# Patient Record
Sex: MALE | Employment: PART TIME | URBAN - METROPOLITAN AREA
[De-identification: names, ages, dates, MRNs, and addresses within clinical notes are randomized per-mention and may not be internally consistent; named-entity substitution may affect disease eponyms.]

---

## 2019-03-13 ENCOUNTER — HOSPITAL ENCOUNTER (INPATIENT)
Age: 54
LOS: 5 days | Discharge: HOME | DRG: 603 | End: 2019-03-18
Attending: EMERGENCY MEDICINE | Admitting: FAMILY MEDICINE
Payer: SELF-PAY

## 2019-03-13 ENCOUNTER — APPOINTMENT (OUTPATIENT)
Dept: VASCULAR SURGERY | Age: 54
DRG: 603 | End: 2019-03-13
Attending: PHYSICIAN ASSISTANT
Payer: SELF-PAY

## 2019-03-13 DIAGNOSIS — L03.115 CELLULITIS OF LEG, RIGHT: Primary | ICD-10-CM

## 2019-03-13 PROBLEM — D72.829 LEUKOCYTOSIS: Status: ACTIVE | Noted: 2019-03-13

## 2019-03-13 PROBLEM — R60.0 EDEMA OF RIGHT LOWER EXTREMITY: Status: ACTIVE | Noted: 2019-03-13

## 2019-03-13 LAB
ALBUMIN SERPL-MCNC: 2.7 G/DL (ref 3.5–5)
ALBUMIN/GLOB SERPL: 0.7 {RATIO} (ref 1.1–2.2)
ALP SERPL-CCNC: 138 U/L (ref 45–117)
ALT SERPL-CCNC: 59 U/L (ref 12–78)
ANION GAP SERPL CALC-SCNC: 7 MMOL/L (ref 5–15)
APTT PPP: 44.7 SEC (ref 22.1–32)
AST SERPL-CCNC: 28 U/L (ref 15–37)
BASOPHILS # BLD: 0 K/UL (ref 0–0.1)
BASOPHILS NFR BLD: 0 % (ref 0–1)
BILIRUB SERPL-MCNC: 0.5 MG/DL (ref 0.2–1)
BUN SERPL-MCNC: 17 MG/DL (ref 6–20)
BUN/CREAT SERPL: 20 (ref 12–20)
CALCIUM SERPL-MCNC: 8.2 MG/DL (ref 8.5–10.1)
CHLORIDE SERPL-SCNC: 107 MMOL/L (ref 97–108)
CO2 SERPL-SCNC: 25 MMOL/L (ref 21–32)
COMMENT, HOLDF: NORMAL
CREAT SERPL-MCNC: 0.86 MG/DL (ref 0.7–1.3)
DIFFERENTIAL METHOD BLD: ABNORMAL
EOSINOPHIL # BLD: 0.2 K/UL (ref 0–0.4)
EOSINOPHIL NFR BLD: 1 % (ref 0–7)
ERYTHROCYTE [DISTWIDTH] IN BLOOD BY AUTOMATED COUNT: 13.6 % (ref 11.5–14.5)
GLOBULIN SER CALC-MCNC: 3.7 G/DL (ref 2–4)
GLUCOSE SERPL-MCNC: 109 MG/DL (ref 65–100)
HCT VFR BLD AUTO: 40.6 % (ref 36.6–50.3)
HGB BLD-MCNC: 13.7 G/DL (ref 12.1–17)
IMM GRANULOCYTES # BLD AUTO: 0.1 K/UL (ref 0–0.04)
IMM GRANULOCYTES NFR BLD AUTO: 1 % (ref 0–0.5)
INR PPP: 2.8 (ref 0.9–1.1)
LACTATE BLD-SCNC: 1.36 MMOL/L (ref 0.4–2)
LYMPHOCYTES # BLD: 1.9 K/UL (ref 0.8–3.5)
LYMPHOCYTES NFR BLD: 12 % (ref 12–49)
MCH RBC QN AUTO: 28.7 PG (ref 26–34)
MCHC RBC AUTO-ENTMCNC: 33.7 G/DL (ref 30–36.5)
MCV RBC AUTO: 85.1 FL (ref 80–99)
MONOCYTES # BLD: 1.3 K/UL (ref 0–1)
MONOCYTES NFR BLD: 8 % (ref 5–13)
NEUTS SEG # BLD: 12.4 K/UL (ref 1.8–8)
NEUTS SEG NFR BLD: 78 % (ref 32–75)
NRBC # BLD: 0 K/UL (ref 0–0.01)
NRBC BLD-RTO: 0 PER 100 WBC
PLATELET # BLD AUTO: 204 K/UL (ref 150–400)
PMV BLD AUTO: 10.4 FL (ref 8.9–12.9)
POTASSIUM SERPL-SCNC: 3.3 MMOL/L (ref 3.5–5.1)
PROT SERPL-MCNC: 6.4 G/DL (ref 6.4–8.2)
PROTHROMBIN TIME: 26.7 SEC (ref 9–11.1)
RBC # BLD AUTO: 4.77 M/UL (ref 4.1–5.7)
SAMPLES BEING HELD,HOLD: NORMAL
SODIUM SERPL-SCNC: 139 MMOL/L (ref 136–145)
THERAPEUTIC RANGE,PTTT: ABNORMAL SECS (ref 58–77)
WBC # BLD AUTO: 15.9 K/UL (ref 4.1–11.1)

## 2019-03-13 PROCEDURE — 80053 COMPREHEN METABOLIC PANEL: CPT

## 2019-03-13 PROCEDURE — 65270000029 HC RM PRIVATE

## 2019-03-13 PROCEDURE — 85730 THROMBOPLASTIN TIME PARTIAL: CPT

## 2019-03-13 PROCEDURE — 85025 COMPLETE CBC W/AUTO DIFF WBC: CPT

## 2019-03-13 PROCEDURE — 93005 ELECTROCARDIOGRAM TRACING: CPT

## 2019-03-13 PROCEDURE — 93971 EXTREMITY STUDY: CPT

## 2019-03-13 PROCEDURE — 85610 PROTHROMBIN TIME: CPT

## 2019-03-13 PROCEDURE — 87040 BLOOD CULTURE FOR BACTERIA: CPT

## 2019-03-13 PROCEDURE — 83605 ASSAY OF LACTIC ACID: CPT

## 2019-03-13 PROCEDURE — 99284 EMERGENCY DEPT VISIT MOD MDM: CPT

## 2019-03-13 PROCEDURE — 36415 COLL VENOUS BLD VENIPUNCTURE: CPT

## 2019-03-13 RX ORDER — MORPHINE SULFATE 2 MG/ML
4 INJECTION, SOLUTION INTRAMUSCULAR; INTRAVENOUS
Status: COMPLETED | OUTPATIENT
Start: 2019-03-13 | End: 2019-03-14

## 2019-03-13 RX ORDER — ONDANSETRON 2 MG/ML
4 INJECTION INTRAMUSCULAR; INTRAVENOUS
Status: COMPLETED | OUTPATIENT
Start: 2019-03-13 | End: 2019-03-14

## 2019-03-14 ENCOUNTER — APPOINTMENT (OUTPATIENT)
Dept: CT IMAGING | Age: 54
DRG: 603 | End: 2019-03-14
Attending: NURSE PRACTITIONER
Payer: SELF-PAY

## 2019-03-14 LAB
ANION GAP SERPL CALC-SCNC: 8 MMOL/L (ref 5–15)
ATRIAL RATE: 77 BPM
BASOPHILS # BLD: 0 K/UL (ref 0–0.1)
BASOPHILS NFR BLD: 0 % (ref 0–1)
BUN SERPL-MCNC: 16 MG/DL (ref 6–20)
BUN/CREAT SERPL: 23 (ref 12–20)
CALCIUM SERPL-MCNC: 7.7 MG/DL (ref 8.5–10.1)
CALCULATED P AXIS, ECG09: 30 DEGREES
CALCULATED R AXIS, ECG10: 3 DEGREES
CALCULATED T AXIS, ECG11: 9 DEGREES
CHLORIDE SERPL-SCNC: 107 MMOL/L (ref 97–108)
CO2 SERPL-SCNC: 26 MMOL/L (ref 21–32)
CREAT SERPL-MCNC: 0.7 MG/DL (ref 0.7–1.3)
DIAGNOSIS, 93000: NORMAL
DIFFERENTIAL METHOD BLD: ABNORMAL
EOSINOPHIL # BLD: 0.4 K/UL (ref 0–0.4)
EOSINOPHIL NFR BLD: 3 % (ref 0–7)
ERYTHROCYTE [DISTWIDTH] IN BLOOD BY AUTOMATED COUNT: 13.6 % (ref 11.5–14.5)
GLUCOSE SERPL-MCNC: 103 MG/DL (ref 65–100)
HCT VFR BLD AUTO: 37.2 % (ref 36.6–50.3)
HGB BLD-MCNC: 12.5 G/DL (ref 12.1–17)
IMM GRANULOCYTES # BLD AUTO: 0.1 K/UL (ref 0–0.04)
IMM GRANULOCYTES NFR BLD AUTO: 1 % (ref 0–0.5)
INR PPP: 2.7 (ref 0.9–1.1)
LYMPHOCYTES # BLD: 1.8 K/UL (ref 0.8–3.5)
LYMPHOCYTES NFR BLD: 15 % (ref 12–49)
MCH RBC QN AUTO: 28.7 PG (ref 26–34)
MCHC RBC AUTO-ENTMCNC: 33.6 G/DL (ref 30–36.5)
MCV RBC AUTO: 85.3 FL (ref 80–99)
MONOCYTES # BLD: 1 K/UL (ref 0–1)
MONOCYTES NFR BLD: 8 % (ref 5–13)
NEUTS SEG # BLD: 9 K/UL (ref 1.8–8)
NEUTS SEG NFR BLD: 73 % (ref 32–75)
NRBC # BLD: 0 K/UL (ref 0–0.01)
NRBC BLD-RTO: 0 PER 100 WBC
P-R INTERVAL, ECG05: 176 MS
PLATELET # BLD AUTO: 173 K/UL (ref 150–400)
PMV BLD AUTO: 10.8 FL (ref 8.9–12.9)
POTASSIUM SERPL-SCNC: 3.7 MMOL/L (ref 3.5–5.1)
PROTHROMBIN TIME: 25.6 SEC (ref 9–11.1)
Q-T INTERVAL, ECG07: 388 MS
QRS DURATION, ECG06: 96 MS
QTC CALCULATION (BEZET), ECG08: 439 MS
RBC # BLD AUTO: 4.36 M/UL (ref 4.1–5.7)
SODIUM SERPL-SCNC: 141 MMOL/L (ref 136–145)
VENTRICULAR RATE, ECG03: 77 BPM
WBC # BLD AUTO: 12.3 K/UL (ref 4.1–11.1)

## 2019-03-14 PROCEDURE — 74011000258 HC RX REV CODE- 258: Performed by: HOSPITALIST

## 2019-03-14 PROCEDURE — 85610 PROTHROMBIN TIME: CPT

## 2019-03-14 PROCEDURE — 74011250637 HC RX REV CODE- 250/637: Performed by: HOSPITALIST

## 2019-03-14 PROCEDURE — 80048 BASIC METABOLIC PNL TOTAL CA: CPT

## 2019-03-14 PROCEDURE — 36415 COLL VENOUS BLD VENIPUNCTURE: CPT

## 2019-03-14 PROCEDURE — 74011250636 HC RX REV CODE- 250/636: Performed by: FAMILY MEDICINE

## 2019-03-14 PROCEDURE — 74011000258 HC RX REV CODE- 258: Performed by: FAMILY MEDICINE

## 2019-03-14 PROCEDURE — 65270000032 HC RM SEMIPRIVATE

## 2019-03-14 PROCEDURE — 85025 COMPLETE CBC W/AUTO DIFF WBC: CPT

## 2019-03-14 PROCEDURE — 74011250636 HC RX REV CODE- 250/636: Performed by: PHYSICIAN ASSISTANT

## 2019-03-14 PROCEDURE — 74011250637 HC RX REV CODE- 250/637: Performed by: FAMILY MEDICINE

## 2019-03-14 PROCEDURE — 74011636320 HC RX REV CODE- 636/320: Performed by: HOSPITALIST

## 2019-03-14 PROCEDURE — 74011000258 HC RX REV CODE- 258: Performed by: PHYSICIAN ASSISTANT

## 2019-03-14 PROCEDURE — 74011250637 HC RX REV CODE- 250/637: Performed by: NURSE PRACTITIONER

## 2019-03-14 PROCEDURE — 73701 CT LOWER EXTREMITY W/DYE: CPT

## 2019-03-14 RX ORDER — SODIUM CHLORIDE 0.9 % (FLUSH) 0.9 %
5-40 SYRINGE (ML) INJECTION AS NEEDED
Status: DISCONTINUED | OUTPATIENT
Start: 2019-03-14 | End: 2019-03-18 | Stop reason: HOSPADM

## 2019-03-14 RX ORDER — SODIUM CHLORIDE 0.9 % (FLUSH) 0.9 %
5-40 SYRINGE (ML) INJECTION EVERY 8 HOURS
Status: DISCONTINUED | OUTPATIENT
Start: 2019-03-14 | End: 2019-03-18 | Stop reason: HOSPADM

## 2019-03-14 RX ORDER — WARFARIN 2.5 MG/1
2.5 TABLET ORAL ONCE
Status: COMPLETED | OUTPATIENT
Start: 2019-03-14 | End: 2019-03-14

## 2019-03-14 RX ORDER — ONDANSETRON 2 MG/ML
4 INJECTION INTRAMUSCULAR; INTRAVENOUS
Status: DISCONTINUED | OUTPATIENT
Start: 2019-03-14 | End: 2019-03-18 | Stop reason: HOSPADM

## 2019-03-14 RX ORDER — ASPIRIN 81 MG/1
81 TABLET ORAL DAILY
Status: DISCONTINUED | OUTPATIENT
Start: 2019-03-14 | End: 2019-03-18 | Stop reason: HOSPADM

## 2019-03-14 RX ORDER — POTASSIUM CHLORIDE 750 MG/1
40 TABLET, FILM COATED, EXTENDED RELEASE ORAL
Status: COMPLETED | OUTPATIENT
Start: 2019-03-14 | End: 2019-03-14

## 2019-03-14 RX ORDER — ACETAMINOPHEN 325 MG/1
650 TABLET ORAL
Status: DISCONTINUED | OUTPATIENT
Start: 2019-03-14 | End: 2019-03-18 | Stop reason: HOSPADM

## 2019-03-14 RX ORDER — MORPHINE SULFATE 2 MG/ML
2 INJECTION, SOLUTION INTRAMUSCULAR; INTRAVENOUS
Status: DISCONTINUED | OUTPATIENT
Start: 2019-03-14 | End: 2019-03-15

## 2019-03-14 RX ORDER — SODIUM CHLORIDE 0.9 % (FLUSH) 0.9 %
10 SYRINGE (ML) INJECTION
Status: COMPLETED | OUTPATIENT
Start: 2019-03-14 | End: 2019-03-14

## 2019-03-14 RX ORDER — TRAMADOL HYDROCHLORIDE 50 MG/1
50 TABLET ORAL
Status: DISCONTINUED | OUTPATIENT
Start: 2019-03-14 | End: 2019-03-17

## 2019-03-14 RX ADMIN — CEFAZOLIN SODIUM 1 G: 1 INJECTION, POWDER, FOR SOLUTION INTRAMUSCULAR; INTRAVENOUS at 09:09

## 2019-03-14 RX ADMIN — TRAMADOL HYDROCHLORIDE 50 MG: 50 TABLET, FILM COATED ORAL at 17:33

## 2019-03-14 RX ADMIN — Medication 10 ML: at 14:39

## 2019-03-14 RX ADMIN — ONDANSETRON 4 MG: 2 INJECTION INTRAMUSCULAR; INTRAVENOUS at 01:02

## 2019-03-14 RX ADMIN — MORPHINE SULFATE 4 MG: 2 INJECTION, SOLUTION INTRAMUSCULAR; INTRAVENOUS at 01:02

## 2019-03-14 RX ADMIN — IOPAMIDOL 100 ML: 612 INJECTION, SOLUTION INTRAVENOUS at 14:39

## 2019-03-14 RX ADMIN — ACETAMINOPHEN 650 MG: 325 TABLET ORAL at 20:47

## 2019-03-14 RX ADMIN — SODIUM CHLORIDE 1000 ML: 900 INJECTION, SOLUTION INTRAVENOUS at 01:02

## 2019-03-14 RX ADMIN — Medication 10 ML: at 20:48

## 2019-03-14 RX ADMIN — WARFARIN SODIUM 2.5 MG: 2.5 TABLET ORAL at 16:19

## 2019-03-14 RX ADMIN — Medication 10 ML: at 06:00

## 2019-03-14 RX ADMIN — ASPIRIN 81 MG: 81 TABLET ORAL at 09:09

## 2019-03-14 RX ADMIN — CEFAZOLIN SODIUM 1 G: 1 INJECTION, POWDER, FOR SOLUTION INTRAMUSCULAR; INTRAVENOUS at 01:18

## 2019-03-14 RX ADMIN — Medication 10 ML: at 15:27

## 2019-03-14 RX ADMIN — POTASSIUM CHLORIDE 40 MEQ: 750 TABLET, EXTENDED RELEASE ORAL at 02:01

## 2019-03-14 RX ADMIN — SODIUM CHLORIDE 100 ML: 900 INJECTION, SOLUTION INTRAVENOUS at 14:39

## 2019-03-14 RX ADMIN — CEFAZOLIN SODIUM 1 G: 1 INJECTION, POWDER, FOR SOLUTION INTRAMUSCULAR; INTRAVENOUS at 16:19

## 2019-03-14 NOTE — H&P
[address]   HISTORY AND PHYSICAL    Name:  Naheed Paris  MR#:  [id number]  :  1965  ACCOUNT #:  [de-identified]  ADMIT DATE:  2019    CHIEF COMPLAINT:  Right lower extremity pain and swelling. HISTORY OF PRESENT ILLNESS:  A 51-year-old  male with past medical history of hypertension, stroke, long-term anti-coagulation, on Coumadin, presented to emergency department with chief complaint of right leg redness, pain, and swelling. He has also noticed radiation of pain to his right thigh with noted redness. Symptoms onset reportedly began approximately four days ago. Swelling was progressive, bothering most of the right leg with noted redness and pain. Pain was nearly moderate in severity. He also complained of having fevers over the last three days. He is not reporting weakness, trauma, prolonged immobilization or surgeries. On arrival to the emergency department, initially reported vital signs, blood pressure 121/75, heart rate of 80, respiratory rate of 20, saturations 98% on room air. A workup included labs showing WBC of 15,900 with left shift and neutrophils 79%. The patient takes Coumadin and INR was therapeutic at 2.8. Right lower extremity venous duplex ultrasound was negative for evidence of DVT. The patient is now seen for admission to the hospital for continued evaluation and treatment. Per the ED, the patient was started on Ancef 1 g IV for antibiotic coverage. Currently, the patient does not complain of dizziness, lightheadedness, focal weakness, numbness, paresthesias, slurred speech, facial droop, headache, neck pain, back pain, chest pain, shortness of breath, cough, congestion, abdominal pain, nausea, vomiting, diarrhea, melena, dysuria, or hematuria. PAST MEDICAL HISTORY:  1. Hypertension. 2.  Stroke. PAST SURGICAL HISTORY:  None. CURRENT MEDICATIONS:  List reviewed, noted on chart records.     Taking Last Dose Start Date End Date Provider    aspirin delayed-release 81 mg tablet    --  --  Provider, Historical    Take  by mouth daily. warfarin (COUMADIN) 2.5 mg tablet    --  --  Provider, Historical    Take 2.5 mg by mouth daily. ALLERGIES:  NO KNOWN DRUG ALLERGIES. SOCIAL HISTORY:  The patient denies smoking, alcohol, or illicit drugs. FAMILY HISTORY:  Hypertension in mother. REVIEW OF SYSTEMS:  Pertinent positives as noted in the HPI. All other systems were reviewed and negative. PHYSICAL EXAMINATION:  VITAL SIGNS:  Temperature is 97.8 degrees Fahrenheit, blood pressure 148/84, heart rate of 59, respiratory rate of 14, O2 saturation 100% on room air. Recorded weight of 203 pounds (92.3 kg), height of 5 feet 9 inches tall. GENERAL:  Overweight male, in no acute respiratory distress. PSYCH:  Awake and alert x3. NEUROLOGICAL:  GCS 15. Moves extremities x4. Sensation is grossly intact without slurred speech or facial droop. HEENT:  Normocephalic and atraumatic. PERRLA. EOMs intact. Sclerae anicteric. Conjunctivae clear. Nares are patent. Oropharynx is clear. Tongue is midline, non-edematous. NECK:  Supple without adenopathy, JVD, carotid bruit, or thyromegaly. LYMPH:  Negative for cervical or supraclavicular adenopathy. LUNGS:  Clear to auscultation bilaterally. CVS/HEART:  Bradycardiac. Regular rhythm without murmurs, rubs or gallops. GI/ABDOMEN:  Soft, nontender, and nondistended. Normoactive bowel sounds. No rebound, guarding or rigidity. No auscultated abdominal bruits. No palpable abdominal mass. BACK:  No CVA tenderness. No step-off deformity. MUSCULOSKELETAL:  Tenderness on palpation of the right leg and foot and ankle, which are markedly swollen and edematous with erythema extending from the right leg along the medial aspect of his right thigh. Negative for left calf tenderness. VASCULAR:  2+ radial and 1+ dorsalis pedis pulses without cyanosis or clubbing.   There is marked nonpitting edema of the right leg and foot. SKIN:  Warm and dry. LABORATORY DATA:  Reviewed, as follows:  Sodium 139, potassium 3.3, chloride 107, CO2 25, BUN 17, creatinine 0.86, glucose 109. Anion gap 7, calcium 8.2, GFR greater than 60. Total bilirubin 0.5, total protein is 6.4, albumin is 3.7. ALT of 59, AST of 20, alkaline phosphatase is 138. WBC of 15.9, hemoglobin 13.7, hematocrit of 40.6, platelets of 906, neutrophils of 78%. INR of 2.8, PT of 26.7, PTT of 24.7. Right lower extremity venous duplex results are reviewed, showing no evidence of DVT. A 12-lead EKG showing normal sinus rhythm, 77 beats per minute. IMPRESSION AND PLAN:  1. Right lower extremity cellulitis. Admit the patient to medical floor. Continue Ancef 1 g q.8 hours. 2.  Right lower extremity edema. Keep the right lower extremity elevated at rest.  3.  Right lower extremity pain. Provide pain management and supportive care. 4.  Hypertension. Currently not on any medicine for the same. Provide antihypertensive medications. 5.  Bradycardia. Repeat vital signs. Monitor closely. 6.  Stroke. No acute neurologic deficits on exam.  7.  Long-term anticoagulation on Coumadin. Per the patient, he has been continued on anticoagulation for prior stroke. No reported history of pulmonary embolism, deep venous thrombosis, atrial fibrillation or other diagnosis requiring treatment with Coumadin. Repeat PT/INR levels in a.m.  8.  Venous thromboembolism prophylaxis. The patient will be on anticoagulant therapy. Consult pharmacist for Coumadin doses. 9.  Code Status:  Full code. 10.  Functional status:  Ambulatory.         Prisca Lopez MD [email or web address]  D:  03/14/2019 1:26  T:  03/14/2019 4:20  JOB #:  [id number]

## 2019-03-14 NOTE — PROGRESS NOTES
Pharmacist Note  Warfarin Dosing  Consult provided for this 48 y.o.male to manage warfarin for \"prior stroke\"  - Per note dated 3/13/19: \"No reported history of pulmonary embolism, deep venous thrombosis, atrial fibrillation or other diagnosis requiring treatment with Coumadin\"       INR Goal: 2 - 3    Home regimen/ tablet size: 2.5 mg daily    Drugs that may increase INR: None  Drugs that may decrease INR: None  Other current anticoagulants/ drugs that may increase bleeding risk: Aspirin  Risk factors: Liver dysfunction  Daily INR ordered: YES    Recent Labs     03/14/19  0353 03/13/19  1922   HGB 12.5 13.7   INR  --  2.8*     Date               INR                  Dose  3/13  2.8  2.5 mg (at home)  3/14  --  TBD                                                                                    Assessment/ Plan:  PT/INR ordered for today and daily thereafter. Will order warfarin as clinically indicated after today's INR results. Pharmacy will continue to monitor daily and adjust therapy as indicated. Please contact the pharmacist at x [phone] or  for outpatient recommendations if needed.

## 2019-03-14 NOTE — ROUTINE PROCESS
TRANSFER - OUT REPORT:    Verbal report given to [employer] SYS WASECA (name) on Patricia Pappas  being transferred to (unit) for routine progression of care       Report consisted of patients Situation, Background, Assessment and   Recommendations(SBAR). Information from the following report(s) SBAR, ED Summary, [facility] and Recent Results was reviewed with the receiving nurse. Lines:   Peripheral IV 03/13/19 Left Antecubital (Active)        Opportunity for questions and clarification was provided.       Patient transported with:   K[facility]o

## 2019-03-14 NOTE — PROGRESS NOTES
Problem: Falls - Risk of  Goal: *Absence of Falls  Document Chao Fall Risk and appropriate interventions in the flowsheet. Outcome: Progressing Towards Goal  Fall Risk Interventions:            Medication Interventions: Teach patient to arise slowly              0229: TRANSFER - IN REPORT:    Verbal report received from Ayad Armas (name) on Krysta Walls  being received from ED (unit) for routine progression of care      Report consisted of patients Situation, Background, Assessment and   Recommendations(SBAR). Information from the following report(s) SBAR, Kardex, ED Summary, [facility] and Cardiac Rhythm Normal sinus was reviewed with the receiving nurse. Opportunity for questions and clarification was provided. Assessment completed upon patients arrival to unit and care assumed. Primary Nurse Ashley Man and Analy Johns RN performed a dual skin assessment on this patient Impairment noted- see wound doc flow sheet  Flakito score is 21    Noted scab on right foot, as well as, noted that RLE is red, swollen, and warm to the touch. Pt states mild pain and received pain medication when in the emergency department. No other impairments noted on the skin. Will continue to monitor. No distress noted, call light in reach, bed in lowest position, pt denies any needs at present moment.

## 2019-03-14 NOTE — PROGRESS NOTES
Problem: Falls - Risk of  Goal: *Absence of Falls  Document Chao Fall Risk and appropriate interventions in the flowsheet.   Outcome: Progressing Towards Goal  Fall Risk Interventions:            Medication Interventions: Teach patient to arise slowly

## 2019-03-14 NOTE — ED TRIAGE NOTES
Pt reports pain in RIGHT groin and fever that started Sunday. Pt states he massaged the area and now the entirety of the RIGHT lower leg is significantly swollen, red, painful. Denies any known break in the skin or hx of DVT, but review of chart shows pt is on Coumadin for hx of stroke. Reports +fever, chills. Last took Ibuprofen 1600.

## 2019-03-14 NOTE — PROGRESS NOTES
Pharmacist Note  Warfarin Dosing  Consult provided for this 48 y.o.male to manage warfarin for \"prior stroke\"  - Per note dated 3/13/19: \"No reported history of pulmonary embolism, deep venous thrombosis, atrial fibrillation or other diagnosis requiring treatment with Coumadin\"       INR Goal: 2 - 3    Home regimen/ tablet size: 2.5 mg daily    Drugs that may increase INR: None  Drugs that may decrease INR: None  Other current anticoagulants/ drugs that may increase bleeding risk: Aspirin  Risk factors: Liver dysfunction  Daily INR ordered: YES    Recent Labs     03/14/19  0855 03/14/19  0353 03/13/19  1922   HGB  --  12.5 13.7   INR 2.7*  --  2.8*     Date               INR                  Dose  3/13  2.8  2.5 mg (at home)  3/14  2.7  2.5 mg                                                                                 Assessment/ Plan: Will order 2.5 mg x one dose today. Pharmacy will continue to monitor daily and adjust therapy as indicated. Please contact the pharmacist at x [phone] or  for outpatient recommendations if needed.

## 2019-03-14 NOTE — WOUND CARE
Wound Consult:  New Patient Visit. Chart reviewed. Consulted for right leg cellulitis. Patient is resting on a total care bed. Assessment:  Patient tells me he has had this in past; at that time an area was found and lanced by MD to drain. He tells me the redness started in his upper medial thigh and he massage area where it was red and indurated. This area has essentially resolved however it then extended in lower leg through foot - especially tender/red over medial ankle area; no induration, crepitus or fluctuance is appreciated in any part of leg through foot. No open wounds; has a few small scattered scabs on medial ankle/foot and medial knee area - may have been areas where infection could have been introduced? Recommendations:  Continue to monitor nutrition, pain, and skin risk scale, and skin assessment. No open areas nor does there appear any area to I&D currently; would continue to monitor; IV antibiotic therapy. Plan:  Please re-consult if needed.   Warner Issa, [address] Office [phone]  Pager [phone]

## 2019-03-14 NOTE — ED NOTES
Gave bedside report regarding, SBAR, MAR, and plan of care to [facility]. Transfered care of patient to RN.

## 2019-03-14 NOTE — PROGRESS NOTES
Hospitalist Progress Note  Syd Diallo NP  Answering service: [phone] from in house phone  Cell: [phone]      Date of Service:  3/14/2019  NAME:  Rivka Villagomez  :  1965  MRN:  [id number]      Admission Summary:   48 yom with pmh of HTN, Stroke, ? Clotting disorder on long term Coumadin use who presented with RLE redness, pain and swelling that started on  and continued to worsen with fevers. Interval history / Subjective:     Patient resting in bed. Reports right leg looks a little better but still swollen and having pain with ambulating. No fevers or chills today, good appetite. Assessment & Plan:     RLE cellulitis   -RLE doppler showing prominent right groin lymph nodes and right popliteal fossa fluid collection  -has h/o abscess with I&D of right thigh last year  -check CT RLE to eval for abscess   -follow blood cultures  -wbc trending down  -prn pain meds, add Tramadol prn today  -elevate RLE  -IV Ancef    Long Term Anticoagulant use   -on Coumadin per pt from clotting disorder and stroke   -pharmacy to dose Coumadin     HTN   -not on home medications, elevated on admission, now wnl   -monitor     H/o Stroke   -continue home meds     Bradycardia-resolved      Code status: Full  DVT prophylaxis: Coumadin    Care Plan discussed with: Patient/Family and Nurse Dr Robert Dill  Disposition: Home w/Family and TBD     Hospital Problems  Date Reviewed: 3/14/2019          Codes Class Noted POA    Leukocytosis ICD-10-CM: F65.454  ICD-9-CM: 288.60  3/13/2019 Unknown        * (Principal) Cellulitis of right lower extremity ICD-10-CM: L03.115  ICD-9-CM: 682.6  3/13/2019 Unknown        Edema of right lower extremity ICD-10-CM: R60.0  ICD-9-CM: 782.3  3/13/2019 Unknown                Review of Systems:   A comprehensive review of systems was negative except for that written in the HPI.        Vital Signs:    Last 24hrs VS reviewed since prior progress note. Most recent are:  Visit Vitals  /70 (BP 1 Location: Right arm, BP Patient Position: At rest)   Pulse 63   Temp 97.9 °F (36.6 °C)   Resp 18   Ht 5' 9\" (1.753 m)   Wt 92.3 kg (203 lb 7.8 oz)   SpO2 98%   BMI 30.05 kg/m²         Intake/Output Summary (Last 24 hours) at 3/14/2019 1004  Last data filed at 3/14/2019 0488  Gross per 24 hour   Intake 240 ml   Output    Net 240 ml        Physical Examination:             Constitutional:  No acute distress, cooperative, pleasant    ENT:  Oral mucous moist, oropharynx benign. Neck supple,    Resp:  CTA bilaterally. No wheezing/rhonchi/rales. No accessory muscle use   CV:  Regular rhythm, normal rate, no murmurs, gallops, rubs    GI:  Soft, non distended, non tender. normoactive bowel sounds,    Musculoskeletal:  No edema, warm, 2+ pulses throughout    Neurologic:  Moves all extremities. AAOx3, CN II-XII reviewed     Psych:  Good insight, Not anxious nor agitated. Skin:  RLE edema with redness and warm to touch, no palpable fluid collection       Data Review:    Review and/or order of clinical lab test  Review and/or order of tests in the radiology section of CPT  Review and/or order of tests in the medicine section of CPT      Labs:     Recent Labs     03/14/19  0353 03/13/19 1922   WBC 12.3* 15.9*   HGB 12.5 13.7   HCT 37.2 40.6    204     Recent Labs     03/14/19  0353 03/13/19 1922    139   K 3.7 3.3*    107   CO2 26 25   BUN 16 17   CREA 0.70 0.86   * 109*   CA 7.7* 8.2*     Recent Labs     03/13/19 1922   SGOT 28   ALT 59   *   TBILI 0.5   TP 6.4   ALB 2.7*   GLOB 3.7     Recent Labs     03/14/19  0855 03/13/19 1922   INR 2.7* 2.8*   PTP 25.6* 26.7*   APTT  --  44.7*      No results for input(s): FE, TIBC, PSAT, FERR in the last 72 hours. No results found for: FOL, RBCF   No results for input(s): PH, PCO2, PO2 in the last 72 hours.   No results for input(s): CPK, CKNDX, TROIQ in the last 72 hours.    No lab exists for component: CPKMB  No results found for: CHOL, CHOLX, CHLST, CHOLV, HDL, LDL, LDLC, DLDLP, TGLX, TRIGL, TRIGP, CHHD, CHHDX  No results found for: GLUCPOC  No results found for: COLOR, APPRN, SPGRU, REFSG, PATRICIA, PROTU, GLUCU, KETU, BILU, UROU, WERO, LEUKU, GLUKE, EPSU, BACTU, WBCU, RBCU, CASTS, UCRY      Medications Reviewed:     Current Facility-Administered Medications   Medication Dose Route Frequency    aspirin delayed-release tablet 81 mg  81 mg Oral DAILY    sodium chloride (NS) flush 5-40 mL  5-40 mL IntraVENous Q8H    sodium chloride (NS) flush 5-40 mL  5-40 mL IntraVENous PRN    ondansetron (ZOFRAN) injection 4 mg  4 mg IntraVENous Q6H PRN    morphine injection 2 mg  2 mg IntraVENous Q4H PRN    ceFAZolin (ANCEF) 1 g in 0.9% sodium chloride (MBP/ADV) 50 mL  1 g IntraVENous Q8H     ______________________________________________________________________  EXPECTED LENGTH OF STAY: - - -  ACTUAL LENGTH OF STAY:          1                 Nelli Guerin NP

## 2019-03-14 NOTE — ED PROVIDER NOTES
49 yo M with hx of stroke and HTN her for evaluation of leg swelling. States sunday night pain to right upper leg; states he massaged area and then  increased swelling/redness. No noted open wounds. Subjective fever x 3 days. Currently taking Coumadin 5 mg daily. Hx of stroke. PCP Dr Hardik Graham. No recent travel; no recent surgery. Denies CP, SOB, abd pain, flank pain, urinary symptoms. The history is provided by the patient. The history is limited by a language barrier. A  was used. Leg Swelling    This is a new problem. The current episode started more than 2 days ago. The problem occurs constantly. The problem has been gradually worsening. The pain is present in the right lower leg. The quality of the pain is described as aching. The pain is at a severity of 9/10. The pain is moderate. There has been no history of extremity trauma. Fever    Pertinent negatives include no cough. Past Medical History:   Diagnosis Date    Hypertension     Stroke [facility])        History reviewed. No pertinent surgical history. History reviewed. No pertinent family history.     Social History     Socioeconomic History    Marital status: SINGLE     Spouse name: Not on file    Number of children: Not on file    Years of education: Not on file    Highest education level: Not on file   Social Needs    Financial resource strain: Not on file    Food insecurity - worry: Not on file    Food insecurity - inability: Not on file    Transportation needs - medical: Not on file   [employer] needs - non-medical: Not on file   Occupational History    Not on file   Tobacco Use    Smoking status: Never Smoker    Smokeless tobacco: Never Used   Substance and Sexual Activity    Alcohol use: No     Frequency: Never    Drug use: No    Sexual activity: Not on file   Other Topics Concern    Not on file   Social History Narrative    Not on file         ALLERGIES: Patient has no known allergies. Review of Systems   Constitutional: Positive for fever. HENT: Negative for facial swelling. Eyes: Negative for discharge. Respiratory: Negative for cough. Cardiovascular: Negative for leg swelling. Gastrointestinal: Negative for abdominal distention. Musculoskeletal: Positive for myalgias. Skin: Positive for color change. Neurological: Negative for seizures and syncope. Psychiatric/Behavioral: Negative for behavioral problems. Vitals:    03/13/19 1910 03/13/19 2023   BP:  121/75   Pulse: 80 66   Resp:  20   Temp:  97.8 °F (36.6 °C)   SpO2: 98% 98%   Weight:  92.3 kg (203 lb 7.8 oz)   Height:  5' 9\" (1.753 m)            Physical Exam   Constitutional: He is oriented to person, place, and time. He appears well-developed and well-nourished. HENT:   Head: Normocephalic and atraumatic. Right Ear: External ear normal.   Left Ear: External ear normal.   Nose: Nose normal.   Mouth/Throat: Oropharynx is clear and moist.   Eyes: Conjunctivae and EOM are normal. Pupils are equal, round, and reactive to light. Right eye exhibits no discharge. Left eye exhibits no discharge. Neck: Normal range of motion. Neck supple. Cardiovascular: Normal rate, regular rhythm, normal heart sounds and intact distal pulses. Pulses:       Dorsalis pedis pulses are 2+ on the right side. Posterior tibial pulses are 2+ on the right side. Pulmonary/Chest: Effort normal and breath sounds normal.   Abdominal: Soft. Bowel sounds are normal. He exhibits no distension. There is no tenderness. There is no rebound and no guarding. Musculoskeletal: Normal range of motion. He exhibits edema. Right lower leg: He exhibits swelling (Extensive Redness/swelling to right lower leg; extends to thigh) and edema. Lymphadenopathy:        Right: Inguinal adenopathy present. Neurological: He is alert and oriented to person, place, and time. No cranial nerve deficit.  Coordination normal.   Skin: Skin is warm and dry. No rash noted. Psychiatric: He has a normal mood and affect. His behavior is normal. Judgment and thought content normal.   Nursing note and vitals reviewed. MDM       Procedures    Discussed case with attending Physician Nacho; in to see. Agrees with care and plan. VELMA Rivera     Patient has been reassessed. Feeling much better. Reviewed labs, medications and radiographics with patient. Aware and agrees to admission. Hospitalist Deirdre for Admission  10:45 PM    ED Room Number: ER05/05  Patient Name and age:  Analy Thakkar 48 y.o.  male  Working Diagnosis:   1.  Cellulitis of leg, right      Readmission: no  Isolation Requirements:  no  Recommended Level of Care:  med/surg  Code Status:  Full

## 2019-03-15 LAB
ANION GAP SERPL CALC-SCNC: 6 MMOL/L (ref 5–15)
BASOPHILS # BLD: 0.1 K/UL (ref 0–0.1)
BASOPHILS NFR BLD: 0 % (ref 0–1)
BUN SERPL-MCNC: 15 MG/DL (ref 6–20)
BUN/CREAT SERPL: 19 (ref 12–20)
CALCIUM SERPL-MCNC: 7.7 MG/DL (ref 8.5–10.1)
CHLORIDE SERPL-SCNC: 108 MMOL/L (ref 97–108)
CO2 SERPL-SCNC: 25 MMOL/L (ref 21–32)
CREAT SERPL-MCNC: 0.79 MG/DL (ref 0.7–1.3)
DIFFERENTIAL METHOD BLD: ABNORMAL
EOSINOPHIL # BLD: 0.4 K/UL (ref 0–0.4)
EOSINOPHIL NFR BLD: 4 % (ref 0–7)
ERYTHROCYTE [DISTWIDTH] IN BLOOD BY AUTOMATED COUNT: 13.7 % (ref 11.5–14.5)
GLUCOSE SERPL-MCNC: 120 MG/DL (ref 65–100)
HCT VFR BLD AUTO: 38.8 % (ref 36.6–50.3)
HGB BLD-MCNC: 12.7 G/DL (ref 12.1–17)
IMM GRANULOCYTES # BLD AUTO: 0.1 K/UL (ref 0–0.04)
IMM GRANULOCYTES NFR BLD AUTO: 1 % (ref 0–0.5)
INR PPP: 2.2 (ref 0.9–1.1)
LYMPHOCYTES # BLD: 2.1 K/UL (ref 0.8–3.5)
LYMPHOCYTES NFR BLD: 18 % (ref 12–49)
MCH RBC QN AUTO: 27.9 PG (ref 26–34)
MCHC RBC AUTO-ENTMCNC: 32.7 G/DL (ref 30–36.5)
MCV RBC AUTO: 85.1 FL (ref 80–99)
MONOCYTES # BLD: 0.8 K/UL (ref 0–1)
MONOCYTES NFR BLD: 7 % (ref 5–13)
NEUTS SEG # BLD: 8.1 K/UL (ref 1.8–8)
NEUTS SEG NFR BLD: 70 % (ref 32–75)
NRBC # BLD: 0 K/UL (ref 0–0.01)
NRBC BLD-RTO: 0 PER 100 WBC
PLATELET # BLD AUTO: 201 K/UL (ref 150–400)
PMV BLD AUTO: 10.8 FL (ref 8.9–12.9)
POTASSIUM SERPL-SCNC: 3.6 MMOL/L (ref 3.5–5.1)
PROTHROMBIN TIME: 21.1 SEC (ref 9–11.1)
RBC # BLD AUTO: 4.56 M/UL (ref 4.1–5.7)
SODIUM SERPL-SCNC: 139 MMOL/L (ref 136–145)
WBC # BLD AUTO: 11.6 K/UL (ref 4.1–11.1)

## 2019-03-15 PROCEDURE — 74011000258 HC RX REV CODE- 258: Performed by: FAMILY MEDICINE

## 2019-03-15 PROCEDURE — 74011250637 HC RX REV CODE- 250/637: Performed by: NURSE PRACTITIONER

## 2019-03-15 PROCEDURE — 74011250636 HC RX REV CODE- 250/636: Performed by: FAMILY MEDICINE

## 2019-03-15 PROCEDURE — 74011250637 HC RX REV CODE- 250/637: Performed by: HOSPITALIST

## 2019-03-15 PROCEDURE — 85025 COMPLETE CBC W/AUTO DIFF WBC: CPT

## 2019-03-15 PROCEDURE — 36415 COLL VENOUS BLD VENIPUNCTURE: CPT

## 2019-03-15 PROCEDURE — 74011250637 HC RX REV CODE- 250/637: Performed by: FAMILY MEDICINE

## 2019-03-15 PROCEDURE — 80048 BASIC METABOLIC PNL TOTAL CA: CPT

## 2019-03-15 PROCEDURE — 85610 PROTHROMBIN TIME: CPT

## 2019-03-15 PROCEDURE — 65270000032 HC RM SEMIPRIVATE

## 2019-03-15 RX ADMIN — CEFAZOLIN SODIUM 1 G: 1 INJECTION, POWDER, FOR SOLUTION INTRAMUSCULAR; INTRAVENOUS at 16:13

## 2019-03-15 RX ADMIN — WARFARIN SODIUM 3 MG: 2 TABLET ORAL at 16:17

## 2019-03-15 RX ADMIN — CEFAZOLIN SODIUM 1 G: 1 INJECTION, POWDER, FOR SOLUTION INTRAMUSCULAR; INTRAVENOUS at 00:58

## 2019-03-15 RX ADMIN — ASPIRIN 81 MG: 81 TABLET ORAL at 08:55

## 2019-03-15 RX ADMIN — Medication 10 ML: at 22:23

## 2019-03-15 RX ADMIN — TRAMADOL HYDROCHLORIDE 50 MG: 50 TABLET, FILM COATED ORAL at 18:22

## 2019-03-15 RX ADMIN — ACETAMINOPHEN 650 MG: 325 TABLET ORAL at 22:23

## 2019-03-15 RX ADMIN — CEFAZOLIN SODIUM 1 G: 1 INJECTION, POWDER, FOR SOLUTION INTRAMUSCULAR; INTRAVENOUS at 08:55

## 2019-03-15 NOTE — PROGRESS NOTES
Hospitalist Progress Note  Modesto Lay NP  Answering service: [phone] from in house phone  Cell: [phone]      Date of Service:  3/15/2019  NAME:  Indu Melo  :  1965  MRN:  [id number]      Admission Summary:   48 yom with pmh of HTN, Stroke, ? Clotting disorder on long term Coumadin use who presented with RLE redness, pain and swelling that started on  and continued to worsen with fevers. Interval history / Subjective:     Patient resting in bed. Feels leg is continuing to improved. Still will redness with induration worse at the ankle but appears to be improving. Discussed if continues to improve will likely discharge tomorrow. RN at bedside and updated on plan.       Assessment & Plan:     RLE cellulitis   -RLE doppler showing prominent right groin lymph nodes and right popliteal fossa fluid collection  -has h/o abscess with I&D of right thigh last year  -CT RLE no abscess   -follow blood cultures NGTD x2 days  -wbc trending down 11.6 today  -prn pain meds  -elevate RLE  -IV Ancef    Long Term Anticoagulant use   -on Coumadin per pt from clotting disorder and stroke   -pharmacy to dose Coumadin     HTN   -not on home medications, elevated on admission, now wnl   -monitor     H/o Stroke   -continue home meds     Bradycardia-resolved      Code status: Full  DVT prophylaxis: Coumadin    Care Plan discussed with: Patient/Family and Nurse Dr Crow Pitt  Disposition: Home w/Family and TBD hopefully home tomorrow     Hospital Problems  Date Reviewed: 3/14/2019          Codes Class Noted POA    Leukocytosis ICD-10-CM: R53.255  ICD-9-CM: 288.60  3/13/2019 Unknown        * (Principal) Cellulitis of right lower extremity ICD-10-CM: L03.115  ICD-9-CM: 682.6  3/13/2019 Unknown        Edema of right lower extremity ICD-10-CM: R60.0  ICD-9-CM: 782.3  3/13/2019 Unknown                Review of Systems:   A comprehensive review of systems was negative except for that written in the HPI. Vital Signs:    Last 24hrs VS reviewed since prior progress note. Most recent are:  Visit Vitals  /86   Pulse 68   Temp 98 °F (36.7 °C)   Resp 18   Ht 5' 9\" (1.753 m)   Wt 92.3 kg (203 lb 7.8 oz)   SpO2 98%   BMI 30.05 kg/m²       No intake or output data in the 24 hours ending 03/15/19 1104     Physical Examination:             Constitutional:  No acute distress, cooperative, pleasant    ENT:  Oral mucous moist, oropharynx benign. Neck supple,    Resp:  CTA bilaterally. No wheezing/rhonchi/rales. No accessory muscle use   CV:  Regular rhythm, normal rate, no murmurs, gallops, rubs    GI:  Soft, non distended, non tender. normoactive bowel sounds,    Musculoskeletal:  No edema, warm, 2+ pulses throughout    Neurologic:  Moves all extremities. AAOx3, follows commands     Psych:  Good insight, Not anxious nor agitated. Skin:  RLE edema with redness and warm to touch, no palpable fluid collection (improving)       Data Review:    Review and/or order of clinical lab test  Review and/or order of tests in the radiology section of CPT  Review and/or order of tests in the medicine section of CPT      Labs:     Recent Labs     03/15/19  0107 03/14/19  0353   WBC 11.6* 12.3*   HGB 12.7 12.5   HCT 38.8 37.2    173     Recent Labs     03/15/19  0107 03/14/19  0353 03/13/19 1922    141 139   K 3.6 3.7 3.3*    107 107   CO2 25 26 25   BUN 15 16 17   CREA 0.79 0.70 0.86   * 103* 109*   CA 7.7* 7.7* 8.2*     Recent Labs     03/13/19 1922   SGOT 28   ALT 59   *   TBILI 0.5   TP 6.4   ALB 2.7*   GLOB 3.7     Recent Labs     03/15/19  0107 03/14/19  0855 03/13/19 1922   INR 2.2* 2.7* 2.8*   PTP 21.1* 25.6* 26.7*   APTT  --   --  44.7*      No results for input(s): FE, TIBC, PSAT, FERR in the last 72 hours. No results found for: FOL, RBCF   No results for input(s): PH, PCO2, PO2 in the last 72 hours.   No results for input(s): CPK, CKNDX, TROIQ in the last 72 hours.     No lab exists for component: CPKMB  No results found for: CHOL, CHOLX, CHLST, CHOLV, HDL, LDL, LDLC, DLDLP, TGLX, TRIGL, TRIGP, CHHD, CHHDX  No results found for: GLUCPOC  No results found for: COLOR, APPRN, SPGRU, REFSG, PATRICIA, PROTU, GLUCU, KETU, BILU, UROU, WERO, LEUKU, GLUKE, EPSU, BACTU, WBCU, RBCU, CASTS, UCRY      Medications Reviewed:     Current Facility-Administered Medications   Medication Dose Route Frequency    warfarin (COUMADIN) tablet 3 mg  3 mg Oral ONCE    Warfarin - pharmacy to dose   Other Rx Dosing/Monitoring    aspirin delayed-release tablet 81 mg  81 mg Oral DAILY    sodium chloride (NS) flush 5-40 mL  5-40 mL IntraVENous Q8H    sodium chloride (NS) flush 5-40 mL  5-40 mL IntraVENous PRN    ondansetron (ZOFRAN) injection 4 mg  4 mg IntraVENous Q6H PRN    morphine injection 2 mg  2 mg IntraVENous Q4H PRN    ceFAZolin (ANCEF) 1 g in 0.9% sodium chloride (MBP/ADV) 50 mL  1 g IntraVENous Q8H    traMADol (ULTRAM) tablet 50 mg  50 mg Oral Q6H PRN    acetaminophen (TYLENOL) tablet 650 mg  650 mg Oral Q6H PRN     ______________________________________________________________________  EXPECTED LENGTH OF STAY: 3d 7h  ACTUAL LENGTH OF STAY:          2                 Michelle Toussaint, NP

## 2019-03-15 NOTE — PROGRESS NOTES
Pharmacist Note  Warfarin Dosing  Consult provided for this 48 y.o.male to manage warfarin for \"prior stroke\"  - Per note dated 3/13/19: \"No reported history of pulmonary embolism, deep venous thrombosis, atrial fibrillation or other diagnosis requiring treatment with Coumadin\"    INR Goal: 2 - 3    Home regimen/ tablet size: 2.5 mg daily    Drugs that may increase INR: None  Drugs that may decrease INR: None  Other current anticoagulants/ drugs that may increase bleeding risk: Aspirin  Risk factors: Liver dysfunction  Daily INR ordered: YES    Recent Labs     03/15/19  0107 03/14/19  0855 03/14/19  0353 03/13/19  1922   HGB 12.7  --  12.5 13.7   INR 2.2* 2.7*  --  2.8*     Date               INR                  Dose  3/13  2.8  2.5 mg (at home)  3/14  2.7  2.5 mg  3/15  2.2  3 mg                                                                                 Assessment/ Plan: Will order 3 mg x one dose today. Pharmacy will continue to monitor daily and adjust therapy as indicated. Please contact the pharmacist at x [phone] or  for outpatient recommendations if needed.

## 2019-03-16 LAB
BASOPHILS # BLD: 0.1 K/UL (ref 0–0.1)
BASOPHILS NFR BLD: 1 % (ref 0–1)
DIFFERENTIAL METHOD BLD: ABNORMAL
EOSINOPHIL # BLD: 0.4 K/UL (ref 0–0.4)
EOSINOPHIL NFR BLD: 4 % (ref 0–7)
ERYTHROCYTE [DISTWIDTH] IN BLOOD BY AUTOMATED COUNT: 13.3 % (ref 11.5–14.5)
HCT VFR BLD AUTO: 41.1 % (ref 36.6–50.3)
HGB BLD-MCNC: 13.6 G/DL (ref 12.1–17)
IMM GRANULOCYTES # BLD AUTO: 0.2 K/UL (ref 0–0.04)
IMM GRANULOCYTES NFR BLD AUTO: 2 % (ref 0–0.5)
INR PPP: 1.8 (ref 0.9–1.1)
LYMPHOCYTES # BLD: 1.7 K/UL (ref 0.8–3.5)
LYMPHOCYTES NFR BLD: 17 % (ref 12–49)
MCH RBC QN AUTO: 27.9 PG (ref 26–34)
MCHC RBC AUTO-ENTMCNC: 33.1 G/DL (ref 30–36.5)
MCV RBC AUTO: 84.2 FL (ref 80–99)
MONOCYTES # BLD: 0.9 K/UL (ref 0–1)
MONOCYTES NFR BLD: 9 % (ref 5–13)
NEUTS SEG # BLD: 6.8 K/UL (ref 1.8–8)
NEUTS SEG NFR BLD: 67 % (ref 32–75)
NRBC # BLD: 0 K/UL (ref 0–0.01)
NRBC BLD-RTO: 0 PER 100 WBC
PLATELET # BLD AUTO: 253 K/UL (ref 150–400)
PMV BLD AUTO: 10.1 FL (ref 8.9–12.9)
PROTHROMBIN TIME: 17.9 SEC (ref 9–11.1)
RBC # BLD AUTO: 4.88 M/UL (ref 4.1–5.7)
WBC # BLD AUTO: 10 K/UL (ref 4.1–11.1)

## 2019-03-16 PROCEDURE — 85610 PROTHROMBIN TIME: CPT

## 2019-03-16 PROCEDURE — 74011250636 HC RX REV CODE- 250/636: Performed by: FAMILY MEDICINE

## 2019-03-16 PROCEDURE — 74011250637 HC RX REV CODE- 250/637: Performed by: FAMILY MEDICINE

## 2019-03-16 PROCEDURE — 65270000032 HC RM SEMIPRIVATE

## 2019-03-16 PROCEDURE — 74011250637 HC RX REV CODE- 250/637: Performed by: NURSE PRACTITIONER

## 2019-03-16 PROCEDURE — 36415 COLL VENOUS BLD VENIPUNCTURE: CPT

## 2019-03-16 PROCEDURE — 85025 COMPLETE CBC W/AUTO DIFF WBC: CPT

## 2019-03-16 PROCEDURE — 74011000258 HC RX REV CODE- 258: Performed by: FAMILY MEDICINE

## 2019-03-16 RX ADMIN — CEFAZOLIN SODIUM 1 G: 1 INJECTION, POWDER, FOR SOLUTION INTRAMUSCULAR; INTRAVENOUS at 17:42

## 2019-03-16 RX ADMIN — Medication 10 ML: at 01:20

## 2019-03-16 RX ADMIN — TRAMADOL HYDROCHLORIDE 50 MG: 50 TABLET, FILM COATED ORAL at 21:29

## 2019-03-16 RX ADMIN — Medication 10 ML: at 14:11

## 2019-03-16 RX ADMIN — WARFARIN SODIUM 3 MG: 2 TABLET ORAL at 17:42

## 2019-03-16 RX ADMIN — CEFAZOLIN SODIUM 1 G: 1 INJECTION, POWDER, FOR SOLUTION INTRAMUSCULAR; INTRAVENOUS at 08:47

## 2019-03-16 RX ADMIN — ASPIRIN 81 MG: 81 TABLET ORAL at 08:47

## 2019-03-16 RX ADMIN — Medication 10 ML: at 21:16

## 2019-03-16 RX ADMIN — CEFAZOLIN SODIUM 1 G: 1 INJECTION, POWDER, FOR SOLUTION INTRAMUSCULAR; INTRAVENOUS at 01:09

## 2019-03-16 NOTE — PROGRESS NOTES
Pharmacist Note  Warfarin Dosing  Consult provided for this 48 y.o.male to manage warfarin for \"prior stroke\"  - Per note dated 3/13/19: \"No reported history of pulmonary embolism, deep venous thrombosis, atrial fibrillation or other diagnosis requiring treatment with Coumadin\"    INR Goal: 2 - 3    Home regimen/ tablet size: 2.5 mg daily    Drugs that may increase INR: None  Drugs that may decrease INR: None  Other current anticoagulants/ drugs that may increase bleeding risk: Aspirin  Risk factors: Liver dysfunction  Daily INR ordered: YES    Recent Labs     03/16/19  0123 03/15/19  0107 03/14/19  0855 03/14/19  0353   HGB 13.6 12.7  --  12.5   INR 1.8* 2.2* 2.7*  --      Date               INR                  Dose  3/13  2.8  2.5 mg (at home)  3/14  2.7  2.5 mg  3/15  2.2  3 mg  3/16  1.8  3 mg                                                                                  Assessment/ Plan: Will order 3 mg x one dose today. Pharmacy will continue to monitor daily and adjust therapy as indicated. Please contact the pharmacist at x [phone] or  for outpatient recommendations if needed.      Raymundo Day, PharmD, BCPP, [facility] Specialist, [facility]

## 2019-03-16 NOTE — PROGRESS NOTES
Hospitalist Progress Note  Manan Messina NP  Answering service: [phone] from in house phone  Cell: [phone]      Date of Service:  3/16/2019  NAME:  Miladys Laguna  :  1965  MRN:  [id number]      Admission Summary:   48 yom with pmh of HTN, Stroke, ? Clotting disorder on long term Coumadin use who presented with RLE redness, pain and swelling that started on  and continued to worsen with fevers. Interval history / Subjective:   Resting in bed. Swelling slightly improved, still with significant induration at right ankle +3 pitting edema. Pain controlled. Eating ok. No other issues overnight. Discussed with RN and pt goal for discharge tomorrow.       Assessment & Plan:     RLE cellulitis   -RLE doppler showing prominent right groin lymph nodes and right popliteal fossa fluid collection  -has h/o abscess with I&D of right thigh last year  -CT RLE no abscess   -follow blood cultures NGTD x3 days  -wbc down to 10.0  -prn pain meds  -elevate RLE  -IV Ancef    Long Term Anticoagulant use   -on Coumadin per pt from clotting disorder and stroke   -pharmacy to dose Coumadin     HTN   -not on home medications, elevated on admission, now wnl   -monitor     H/o Stroke   -continue home meds     Bradycardia-resolved      Code status: Full  DVT prophylaxis: Coumadin    Care Plan discussed with: Patient/Family and Nurse Dr Dora Augustine  Disposition: Home w/Family and TBD hopefully home tomorrow     Hospital Problems  Date Reviewed: 3/14/2019          Codes Class Noted POA    Leukocytosis ICD-10-CM: I98.221  ICD-9-CM: 288.60  3/13/2019 Unknown        * (Principal) Cellulitis of right lower extremity ICD-10-CM: L03.115  ICD-9-CM: 682.6  3/13/2019 Unknown        Edema of right lower extremity ICD-10-CM: R60.0  ICD-9-CM: 782.3  3/13/2019 Unknown                Review of Systems:   A comprehensive review of systems was negative except for that written in the HPI. Vital Signs:    Last 24hrs VS reviewed since prior progress note. Most recent are:  Visit Vitals  /74 (BP 1 Location: Right arm, BP Patient Position: At rest)   Pulse 64   Temp 98.3 °F (36.8 °C)   Resp 18   Ht 5' 9\" (1.753 m)   Wt 92.3 kg (203 lb 7.8 oz)   SpO2 97%   BMI 30.05 kg/m²       No intake or output data in the 24 hours ending 03/16/19 0850     Physical Examination:             Constitutional:  No acute distress, cooperative, pleasant    ENT:  Oral mucous moist, oropharynx benign. Neck supple,    Resp:  CTA bilaterally. No wheezing/rhonchi/rales. No accessory muscle use   CV:  Regular rhythm, normal rate, no murmurs, gallops, rubs    GI:  Soft, non distended, non tender. normoactive bowel sounds,    Musculoskeletal:  No edema, warm, 2+ pulses throughout    Neurologic:  Moves all extremities. AAOx3, follows commands     Psych:  Good insight, Not anxious nor agitated. Skin:  RLE edema with redness and warm to touch, no palpable fluid collection +3 induration at right ankle       Data Review:    Review and/or order of clinical lab test  Review and/or order of tests in the radiology section of CPT  Review and/or order of tests in the medicine section of CPT      Labs:     Recent Labs     03/16/19  0123 03/15/19  0107   WBC 10.0 11.6*   HGB 13.6 12.7   HCT 41.1 38.8    201     Recent Labs     03/15/19  0107 03/14/19  0353 03/13/19 1922    141 139   K 3.6 3.7 3.3*    107 107   CO2 25 26 25   BUN 15 16 17   CREA 0.79 0.70 0.86   * 103* 109*   CA 7.7* 7.7* 8.2*     Recent Labs     03/13/19 1922   SGOT 28   ALT 59   *   TBILI 0.5   TP 6.4   ALB 2.7*   GLOB 3.7     Recent Labs     03/16/19  0123 03/15/19  0107 03/14/19  0855 03/13/19 1922   INR 1.8* 2.2* 2.7* 2.8*   PTP 17.9* 21.1* 25.6* 26.7*   APTT  --   --   --  44.7*      No results for input(s): FE, TIBC, PSAT, FERR in the last 72 hours.    No results found for: FOL, RBCF   No results for input(s): PH, PCO2, PO2 in the last 72 hours. No results for input(s): CPK, CKNDX, TROIQ in the last 72 hours.     No lab exists for component: CPKMB  No results found for: CHOL, CHOLX, CHLST, CHOLV, HDL, LDL, LDLC, DLDLP, TGLX, TRIGL, TRIGP, CHHD, CHHDX  No results found for: GLUCPOC  No results found for: COLOR, APPRN, SPGRU, REFSG, PATRICIA, PROTU, GLUCU, KETU, BILU, UROU, WERO, LEUKU, GLUKE, EPSU, BACTU, WBCU, RBCU, CASTS, UCRY      Medications Reviewed:     Current Facility-Administered Medications   Medication Dose Route Frequency    warfarin (COUMADIN) tablet 3 mg  3 mg Oral ONCE    Warfarin - pharmacy to dose   Other Rx Dosing/Monitoring    aspirin delayed-release tablet 81 mg  81 mg Oral DAILY    sodium chloride (NS) flush 5-40 mL  5-40 mL IntraVENous Q8H    sodium chloride (NS) flush 5-40 mL  5-40 mL IntraVENous PRN    ondansetron (ZOFRAN) injection 4 mg  4 mg IntraVENous Q6H PRN    ceFAZolin (ANCEF) 1 g in 0.9% sodium chloride (MBP/ADV) 50 mL  1 g IntraVENous Q8H    traMADol (ULTRAM) tablet 50 mg  50 mg Oral Q6H PRN    acetaminophen (TYLENOL) tablet 650 mg  650 mg Oral Q6H PRN     ______________________________________________________________________  EXPECTED LENGTH OF STAY: 3d 7h  ACTUAL LENGTH OF STAY:          3                 Modesto Lay NP

## 2019-03-17 ENCOUNTER — APPOINTMENT (OUTPATIENT)
Dept: CT IMAGING | Age: 54
DRG: 603 | End: 2019-03-17
Attending: NURSE PRACTITIONER
Payer: SELF-PAY

## 2019-03-17 LAB
BASOPHILS # BLD: 0 K/UL (ref 0–0.1)
BASOPHILS NFR BLD: 0 % (ref 0–1)
DIFFERENTIAL METHOD BLD: ABNORMAL
EOSINOPHIL # BLD: 0.7 K/UL (ref 0–0.4)
EOSINOPHIL NFR BLD: 6 % (ref 0–7)
ERYTHROCYTE [DISTWIDTH] IN BLOOD BY AUTOMATED COUNT: 13.2 % (ref 11.5–14.5)
HCT VFR BLD AUTO: 43.2 % (ref 36.6–50.3)
HGB BLD-MCNC: 14.2 G/DL (ref 12.1–17)
IMM GRANULOCYTES # BLD AUTO: 0 K/UL
IMM GRANULOCYTES NFR BLD AUTO: 0 %
INR PPP: 1.5 (ref 0.9–1.1)
LYMPHOCYTES # BLD: 1.3 K/UL (ref 0.8–3.5)
LYMPHOCYTES NFR BLD: 11 % (ref 12–49)
MCH RBC QN AUTO: 27.7 PG (ref 26–34)
MCHC RBC AUTO-ENTMCNC: 32.9 G/DL (ref 30–36.5)
MCV RBC AUTO: 84.2 FL (ref 80–99)
MONOCYTES # BLD: 0.3 K/UL (ref 0–1)
MONOCYTES NFR BLD: 3 % (ref 5–13)
NEUTS SEG # BLD: 9.2 K/UL (ref 1.8–8)
NEUTS SEG NFR BLD: 80 % (ref 32–75)
NRBC # BLD: 0 K/UL (ref 0–0.01)
NRBC BLD-RTO: 0 PER 100 WBC
PLATELET # BLD AUTO: 292 K/UL (ref 150–400)
PLATELET COMMENTS,PCOM: ABNORMAL
PMV BLD AUTO: 10.1 FL (ref 8.9–12.9)
PROTHROMBIN TIME: 15 SEC (ref 9–11.1)
RBC # BLD AUTO: 5.13 M/UL (ref 4.1–5.7)
RBC MORPH BLD: ABNORMAL
WBC # BLD AUTO: 11.5 K/UL (ref 4.1–11.1)

## 2019-03-17 PROCEDURE — 74011250637 HC RX REV CODE- 250/637: Performed by: HOSPITALIST

## 2019-03-17 PROCEDURE — 65270000032 HC RM SEMIPRIVATE

## 2019-03-17 PROCEDURE — 74011000258 HC RX REV CODE- 258: Performed by: FAMILY MEDICINE

## 2019-03-17 PROCEDURE — 74011636320 HC RX REV CODE- 636/320: Performed by: INTERNAL MEDICINE

## 2019-03-17 PROCEDURE — 85610 PROTHROMBIN TIME: CPT

## 2019-03-17 PROCEDURE — 36415 COLL VENOUS BLD VENIPUNCTURE: CPT

## 2019-03-17 PROCEDURE — 74011000258 HC RX REV CODE- 258: Performed by: INTERNAL MEDICINE

## 2019-03-17 PROCEDURE — 74011250637 HC RX REV CODE- 250/637: Performed by: FAMILY MEDICINE

## 2019-03-17 PROCEDURE — 74011250636 HC RX REV CODE- 250/636: Performed by: FAMILY MEDICINE

## 2019-03-17 PROCEDURE — 73701 CT LOWER EXTREMITY W/DYE: CPT

## 2019-03-17 PROCEDURE — 85025 COMPLETE CBC W/AUTO DIFF WBC: CPT

## 2019-03-17 PROCEDURE — 74011250637 HC RX REV CODE- 250/637: Performed by: NURSE PRACTITIONER

## 2019-03-17 RX ORDER — OXYCODONE AND ACETAMINOPHEN 5; 325 MG/1; MG/1
1 TABLET ORAL
Status: DISCONTINUED | OUTPATIENT
Start: 2019-03-17 | End: 2019-03-18 | Stop reason: HOSPADM

## 2019-03-17 RX ORDER — WARFARIN 4 MG/1
4 TABLET ORAL ONCE
Status: COMPLETED | OUTPATIENT
Start: 2019-03-17 | End: 2019-03-17

## 2019-03-17 RX ORDER — SODIUM CHLORIDE 0.9 % (FLUSH) 0.9 %
10 SYRINGE (ML) INJECTION
Status: COMPLETED | OUTPATIENT
Start: 2019-03-17 | End: 2019-03-17

## 2019-03-17 RX ADMIN — OXYCODONE AND ACETAMINOPHEN 1 TABLET: 5; 325 TABLET ORAL at 14:02

## 2019-03-17 RX ADMIN — CEFAZOLIN SODIUM 1 G: 1 INJECTION, POWDER, FOR SOLUTION INTRAMUSCULAR; INTRAVENOUS at 01:25

## 2019-03-17 RX ADMIN — Medication 10 ML: at 02:27

## 2019-03-17 RX ADMIN — Medication 10 ML: at 21:26

## 2019-03-17 RX ADMIN — WARFARIN SODIUM 4 MG: 4 TABLET ORAL at 17:26

## 2019-03-17 RX ADMIN — IOPAMIDOL 100 ML: 612 INJECTION, SOLUTION INTRAVENOUS at 21:26

## 2019-03-17 RX ADMIN — TRAMADOL HYDROCHLORIDE 50 MG: 50 TABLET, FILM COATED ORAL at 10:09

## 2019-03-17 RX ADMIN — CEFAZOLIN SODIUM 1 G: 1 INJECTION, POWDER, FOR SOLUTION INTRAMUSCULAR; INTRAVENOUS at 17:25

## 2019-03-17 RX ADMIN — OXYCODONE AND ACETAMINOPHEN 1 TABLET: 5; 325 TABLET ORAL at 22:13

## 2019-03-17 RX ADMIN — SODIUM CHLORIDE 100 ML: 900 INJECTION, SOLUTION INTRAVENOUS at 21:26

## 2019-03-17 RX ADMIN — ASPIRIN 81 MG: 81 TABLET ORAL at 08:55

## 2019-03-17 RX ADMIN — CEFAZOLIN SODIUM 1 G: 1 INJECTION, POWDER, FOR SOLUTION INTRAMUSCULAR; INTRAVENOUS at 08:55

## 2019-03-17 NOTE — PROGRESS NOTES
Problem: Falls - Risk of  Goal: *Absence of Falls  Document Chao Fall Risk and appropriate interventions in the flowsheet.   Outcome: Progressing Towards Goal  Fall Risk Interventions:            Medication Interventions: Evaluate medications/consider consulting pharmacy, Patient to call before getting OOB, Teach patient to arise slowly

## 2019-03-17 NOTE — PROGRESS NOTES
Pharmacist Note  Warfarin Dosing  Consult provided for this 48 y.o.male to manage warfarin for prior stroke (PTA medication). Per admitting physician no reported history of VTE, AFIB or other diagnosis requiring treatment with warfarin. INR Goal: 2 - 3    Home regimen/ tablet size: 2.5 mg PO daily     Drugs that may increase INR: None  Drugs that may decrease INR: None  Other current anticoagulants/ drugs that may increase bleeding risk: Aspirin  Risk factors: Liver dysfunction  Daily INR ordered: YES    Recent Labs     03/17/19  0123 03/16/19  0123 03/15/19  0107   HGB 14.2 13.6 12.7   INR 1.5* 1.8* 2.2*     Date               INR                  Dose  3/13  2.8  2.5 mg (PTA)   3/14  2.7  2.5 mg    3/15  2.2  3 mg    3/16  1.8  3 mg    3/17  1.5  4 mg                                                                                 Assessment/ Plan: Will order warfarin 4 mg PO x 1 dose. Pharmacy will continue to monitor daily and adjust therapy as indicated. Please contact the pharmacist at  for outpatient recommendations if needed.

## 2019-03-17 NOTE — PROGRESS NOTES
A Spiritual Care Partner Volunteer visited patient in Rm 622 on 3/17/2019.   Documented by:  Chaplain Stein MDiv, MS, [address] [place] [address] ([id number])

## 2019-03-17 NOTE — PROGRESS NOTES
Hospitalist Progress Note  Vesna Cooper NP  Answering service: [phone] from in house phone  Cell: [phone]      Date of Service:  3/17/2019  NAME:  Ara Corona  :  1965  MRN:  [id number]      Admission Summary:   48 yom with pmh of HTN, Stroke, ? Clotting disorder on long term Coumadin use who presented with RLE redness, pain and swelling that started on  and continued to worsen with fevers. Interval history / Subjective:   Resting in bed. Reporting pain in right ankle worse this morning. Ankle more tender to touch and more edema at site. Surrounding redness and induration improved. No fevers or chills. Eating and drinking ok. Will come back this afternoon to see however pt is doing if sxs improved maybe able to go home today. 1356 Addendum: Continues to have tenderness and pain at ankle. Discussed with Dr Marlon Jeffers, will check another CT to rule abscess and evidence of septic joint. Changed to Percocet to help manage pain. Continue current abx.    Assessment & Plan:     RLE cellulitis   -RLE doppler showing prominent right groin lymph nodes and right popliteal fossa fluid collection  -has h/o abscess with I&D of right thigh last year  -CT RLE no abscess   -follow blood cultures NGTD x4 days  -wbc slightly up to 11.5 no fevers  -prn pain meds  -elevate RLE  -IV Ancef    Long Term Anticoagulant use   -on Coumadin per pt from clotting disorder and stroke   -pharmacy to dose Coumadin     HTN   -not on home medications, elevated on admission, now wnl   -monitor     H/o Stroke   -continue home meds     Bradycardia-resolved      Code status: Full  DVT prophylaxis: Coumadin    Care Plan discussed with: Patient/Family and Nurse Dr Marlon Jeffers  Disposition: Home w/Family and TBD home this afternoon vs tomorrow     Hospital Problems  Date Reviewed: 3/14/2019          Codes Class Noted POA    Leukocytosis ICD-10-CM: Z84.911  ICD-9-CM: 288.60  3/13/2019 Unknown        * (Principal) Cellulitis of right lower extremity ICD-10-CM: L03.115  ICD-9-CM: 682.6  3/13/2019 Unknown        Edema of right lower extremity ICD-10-CM: R60.0  ICD-9-CM: 782.3  3/13/2019 Unknown                Review of Systems:   A comprehensive review of systems was negative except for that written in the HPI. Vital Signs:    Last 24hrs VS reviewed since prior progress note. Most recent are:  Visit Vitals  /83   Pulse 66   Temp 98.3 °F (36.8 °C)   Resp 18   Ht 5' 9\" (1.753 m)   Wt 92.3 kg (203 lb 7.8 oz)   SpO2 98%   BMI 30.05 kg/m²         Intake/Output Summary (Last 24 hours) at 3/17/2019 0836  Last data filed at 3/16/2019 1754  Gross per 24 hour   Intake 799 ml   Output    Net 799 ml        Physical Examination:             Constitutional:  No acute distress, cooperative, pleasant    ENT:  Oral mucous moist, oropharynx benign. Neck supple,    Resp:  CTA bilaterally. No wheezing/rhonchi/rales. No accessory muscle use   CV:  Regular rhythm, normal rate, no murmurs, gallops, rubs    GI:  Soft, non distended, non tender. normoactive bowel sounds,    Musculoskeletal:  No edema, warm, 2+ pulses throughout. R ankle redness with +1 induration however tender to light palpation     Neurologic:  Moves all extremities. AAOx3, follows commands     Psych:  Good insight, Not anxious nor agitated. Data Review:    Review and/or order of clinical lab test  Review and/or order of tests in the medicine section of [facility]      Labs:     Recent Labs     03/17/19  0123 03/16/19  0123   WBC 11.5* 10.0   HGB 14.2 13.6   HCT 43.2 41.1    253     Recent Labs     03/15/19  0107      K 3.6      CO2 25   BUN 15   CREA 0.79   *   CA 7.7*     No results for input(s): SGOT, GPT, ALT, AP, TBIL, TBILI, TP, ALB, GLOB, GGT, AML, LPSE in the last 72 hours.     No lab exists for component: AMYP, HLPSE  Recent Labs     03/17/19  0123 03/16/19  0123 03/15/19  0107   INR 1.5* 1.8* 2.2*   PTP 15.0* 17.9* 21.1*      No results for input(s): FE, TIBC, PSAT, FERR in the last 72 hours. No results found for: FOL, RBCF   No results for input(s): PH, PCO2, PO2 in the last 72 hours. No results for input(s): CPK, CKNDX, TROIQ in the last 72 hours.     No lab exists for component: CPKMB  No results found for: CHOL, CHOLX, CHLST, CHOLV, HDL, LDL, LDLC, DLDLP, TGLX, TRIGL, TRIGP, CHHD, CHHDX  No results found for: GLUCPOC  No results found for: COLOR, APPRN, SPGRU, REFSG, PATRICIA, PROTU, GLUCU, KETU, BILU, UROU, WERO, LEUKU, GLUKE, EPSU, BACTU, WBCU, RBCU, CASTS, UCRY      Medications Reviewed:     Current Facility-Administered Medications   Medication Dose Route Frequency    Warfarin - pharmacy to dose   Other Rx Dosing/Monitoring    aspirin delayed-release tablet 81 mg  81 mg Oral DAILY    sodium chloride (NS) flush 5-40 mL  5-40 mL IntraVENous Q8H    sodium chloride (NS) flush 5-40 mL  5-40 mL IntraVENous PRN    ondansetron (ZOFRAN) injection 4 mg  4 mg IntraVENous Q6H PRN    ceFAZolin (ANCEF) 1 g in 0.9% sodium chloride (MBP/ADV) 50 mL  1 g IntraVENous Q8H    traMADol (ULTRAM) tablet 50 mg  50 mg Oral Q6H PRN    acetaminophen (TYLENOL) tablet 650 mg  650 mg Oral Q6H PRN     ______________________________________________________________________  EXPECTED LENGTH OF STAY: 3d 7h  ACTUAL LENGTH OF STAY:          [address] [facility], NP

## 2019-03-18 VITALS
WEIGHT: 203.48 LBS | TEMPERATURE: 98 F | HEIGHT: 69 IN | RESPIRATION RATE: 18 BRPM | BODY MASS INDEX: 30.14 KG/M2 | OXYGEN SATURATION: 98 % | HEART RATE: 67 BPM | SYSTOLIC BLOOD PRESSURE: 148 MMHG | DIASTOLIC BLOOD PRESSURE: 90 MMHG

## 2019-03-18 PROBLEM — D72.829 LEUKOCYTOSIS: Status: RESOLVED | Noted: 2019-03-13 | Resolved: 2019-03-18

## 2019-03-18 LAB
BASOPHILS # BLD: 0 K/UL (ref 0–0.1)
BASOPHILS NFR BLD: 0 % (ref 0–1)
DIFFERENTIAL METHOD BLD: ABNORMAL
EOSINOPHIL # BLD: 0.1 K/UL (ref 0–0.4)
EOSINOPHIL NFR BLD: 1 % (ref 0–7)
ERYTHROCYTE [DISTWIDTH] IN BLOOD BY AUTOMATED COUNT: 13 % (ref 11.5–14.5)
HCT VFR BLD AUTO: 41.1 % (ref 36.6–50.3)
HGB BLD-MCNC: 13.8 G/DL (ref 12.1–17)
IMM GRANULOCYTES # BLD AUTO: 0 K/UL
IMM GRANULOCYTES NFR BLD AUTO: 0 %
INR PPP: 1.4 (ref 0.9–1.1)
LYMPHOCYTES # BLD: 1.1 K/UL (ref 0.8–3.5)
LYMPHOCYTES NFR BLD: 12 % (ref 12–49)
MCH RBC QN AUTO: 28.4 PG (ref 26–34)
MCHC RBC AUTO-ENTMCNC: 33.6 G/DL (ref 30–36.5)
MCV RBC AUTO: 84.6 FL (ref 80–99)
MONOCYTES # BLD: 0.7 K/UL (ref 0–1)
MONOCYTES NFR BLD: 7 % (ref 5–13)
NEUTS BAND NFR BLD MANUAL: 2 % (ref 0–6)
NEUTS SEG # BLD: 7.4 K/UL (ref 1.8–8)
NEUTS SEG NFR BLD: 78 % (ref 32–75)
NRBC # BLD: 0 K/UL (ref 0–0.01)
NRBC BLD-RTO: 0 PER 100 WBC
PLATELET # BLD AUTO: 295 K/UL (ref 150–400)
PLATELET COMMENTS,PCOM: ABNORMAL
PMV BLD AUTO: 10 FL (ref 8.9–12.9)
PROTHROMBIN TIME: 13.5 SEC (ref 9–11.1)
RBC # BLD AUTO: 4.86 M/UL (ref 4.1–5.7)
RBC MORPH BLD: ABNORMAL
WBC # BLD AUTO: 9.3 K/UL (ref 4.1–11.1)

## 2019-03-18 PROCEDURE — 94760 N-INVAS EAR/PLS OXIMETRY 1: CPT

## 2019-03-18 PROCEDURE — 74011250637 HC RX REV CODE- 250/637: Performed by: FAMILY MEDICINE

## 2019-03-18 PROCEDURE — 36415 COLL VENOUS BLD VENIPUNCTURE: CPT

## 2019-03-18 PROCEDURE — 85025 COMPLETE CBC W/AUTO DIFF WBC: CPT

## 2019-03-18 PROCEDURE — 74011000258 HC RX REV CODE- 258: Performed by: FAMILY MEDICINE

## 2019-03-18 PROCEDURE — 74011250637 HC RX REV CODE- 250/637: Performed by: NURSE PRACTITIONER

## 2019-03-18 PROCEDURE — 85610 PROTHROMBIN TIME: CPT

## 2019-03-18 PROCEDURE — 74011250636 HC RX REV CODE- 250/636: Performed by: FAMILY MEDICINE

## 2019-03-18 PROCEDURE — 97161 PT EVAL LOW COMPLEX 20 MIN: CPT

## 2019-03-18 RX ORDER — WARFARIN SODIUM 5 MG/1
5 TABLET ORAL ONCE
Status: DISCONTINUED | OUTPATIENT
Start: 2019-03-18 | End: 2019-03-18

## 2019-03-18 RX ORDER — WARFARIN 2.5 MG/1
5 TABLET ORAL DAILY
Qty: 20 TAB | Refills: 0 | Status: SHIPPED
Start: 2019-03-18

## 2019-03-18 RX ORDER — OXYCODONE AND ACETAMINOPHEN 5; 325 MG/1; MG/1
1 TABLET ORAL
Qty: 5 TAB | Refills: 0 | Status: SHIPPED | OUTPATIENT
Start: 2019-03-18 | End: 2019-03-21

## 2019-03-18 RX ORDER — WARFARIN SODIUM 5 MG/1
5 TABLET ORAL ONCE
Status: COMPLETED | OUTPATIENT
Start: 2019-03-18 | End: 2019-03-18

## 2019-03-18 RX ORDER — CEPHALEXIN 500 MG/1
500 CAPSULE ORAL 4 TIMES DAILY
Qty: 16 CAP | Refills: 0 | Status: SHIPPED | OUTPATIENT
Start: 2019-03-18 | End: 2019-03-22

## 2019-03-18 RX ADMIN — Medication 10 ML: at 05:06

## 2019-03-18 RX ADMIN — OXYCODONE AND ACETAMINOPHEN 1 TABLET: 5; 325 TABLET ORAL at 11:25

## 2019-03-18 RX ADMIN — Medication 10 ML: at 13:38

## 2019-03-18 RX ADMIN — CEFAZOLIN SODIUM 1 G: 1 INJECTION, POWDER, FOR SOLUTION INTRAMUSCULAR; INTRAVENOUS at 09:45

## 2019-03-18 RX ADMIN — ASPIRIN 81 MG: 81 TABLET ORAL at 09:45

## 2019-03-18 RX ADMIN — WARFARIN SODIUM 5 MG: 5 TABLET ORAL at 13:38

## 2019-03-18 RX ADMIN — CEFAZOLIN SODIUM 1 G: 1 INJECTION, POWDER, FOR SOLUTION INTRAMUSCULAR; INTRAVENOUS at 00:36

## 2019-03-18 NOTE — PROGRESS NOTES
Hospital follow-up PCP transitional care appointment has been scheduled with Dr. Maurizio Khalil for Tuesday, 4/9/19 at 11:30 a.m. Pending patient discharge. mAi Murguia, Care Management Specialist.    Requested a sooner PCP follow up appointment  for patient to be seen within five days to have INR check.  requested the name of attending physician name and phone number. Dr. Savanna Harris was provided. Contacted Elbert Robles LCSW regarding appointment information.  Ami Murguia Care Management Specialist.

## 2019-03-18 NOTE — PROGRESS NOTES
Reason for Admission:   RLE cellulitis                   RRAT Score:     4                Plan for utilizing home health:   None                       Likelihood of Readmission:  low                         Transition of Care Plan:       Patient stated he has had insurance in past but is currently uninsured. Patient stated he can cover his prescriptions. Provided patient Care card and [facility] information. Care Management Interventions  PCP Verified by CM:  Yes  Current Support Network: Own Home  [place] of Choice Offered: Yes  Discharge Location  Discharge Placement: Home

## 2019-03-18 NOTE — PROGRESS NOTES
[employer]    Rounded on pt. Pt. Spoke English.     Ludwig Gardner, Certified     To request an  send email to:  [email or web address]

## 2019-03-18 NOTE — PROGRESS NOTES
Pharmacist Note  Warfarin Dosing  Consult provided for this 48 y.o.male to manage warfarin for prior stroke (PTA medication). Per admitting physician no reported history of VTE, AFIB or other diagnosis requiring treatment with warfarin. INR Goal: 2 - 3    Home regimen/ tablet size: 2.5 mg PO daily     Drugs that may increase INR: None  Drugs that may decrease INR: None  Other current anticoagulants/ drugs that may increase bleeding risk: Aspirin  Risk factors: Liver dysfunction  Daily INR ordered: YES    Recent Labs     03/18/19  0108 03/17/19  0123 03/16/19  0123   HGB 13.8 14.2 13.6   INR 1.4* 1.5* 1.8*     Date               INR                  Dose  3/13  2.8  2.5 mg (PTA)   3/14  2.7  2.5 mg    3/15  2.2  3 mg    3/16  1.8  3 mg    3/17  1.5  4 mg   3/18  1.4  5 mg                                   Assessment/ Plan:  INR continues to drop below target range. Will order warfarin 5 mg PO x 1 dose. Pharmacy will continue to monitor daily and adjust therapy as indicated. Please contact the pharmacist at  for outpatient recommendations if needed.

## 2019-03-18 NOTE — DISCHARGE SUMMARY
Discharge Summary     PATIENT ID: Santos Zuniga  MRN: [id number]   YOB: 1965    DATE OF ADMISSION: 3/13/2019  7:11 PM    DATE OF DISCHARGE: 3/18/2019  PRIMARY CARE PROVIDER: Jessica Anaya MD   ATTENDING PHYSICIAN: Ryan Kaplan MD  DISCHARGING PROVIDER: Ton Ray NP. To contact this individual call [phone] and ask the  to page. If unavailable ask to be transferred the Adult Hospitalist Department. CONSULTATIONS: IP CONSULT TO HOSPITALIST    ADMITTING [facility] COURSE:   Pt presented with RLE redness, pain and swelling that started 3/10/2019 and continued to worsen and developed fevers.      Pmhx of HTN, Stroke, ? Clotting disorder on long term Coumadin     DISCHARGE DIAGNOSES / PLAN:      RLE cellulitis:   - RLE doppler showed prominent R groin lymph nodes/R popliteal fossa fluid collection  - hx abscess with I&D of right thigh last year  - CT 3/14 and 3/17 of the RLE show no abscess. Swelling appears as though it has improved (skin more wrinkled),  to tough at out ankle and still has erythema   - follow blood cultures NGTD x5 days  - leukocytosis resolved and has no fevers  - prn pain meds, will give limited supply of percocet for severe pain on d/c.   - elevate RLE when able  - discharge on 4 more days of kelfex/add alverto q  - follo wup with PCP     Long Term Anticoagulant use   - on Coumadin per pt from clotting disorder and stroke   - INR decreased despite increase of coumadin dosing during this admit. Confirmed that pt takes 2.5 mg at home. Instructed pt to take 5 mg daily until he has his INR checked by his PCP THIS WEEK.      HTN: BP overall improved, on no meds for this PTA  - follow up with PCP     Hx Stroke: continue home meds      Bradycardia: resolved        FOLLOW UP APPOINTMENTS:    Follow-up Information     Follow up With Specialties Details Why Contact Info    Jessica Anaya MD [facility] In 5 days  [address] [place] [address]  [phone]         Need to have INR checked/coumadin adjusted as needed. Skin/Cellulitis check up for R lower extremity. ADDITIONAL CARE RECOMMENDATIONS:   - Take full course of antibiotics  - Take probiotics until complete    Your Coumadin has been adjusted to 5 mg daily, please take this dose until you have your INR checked by your PCP by the end of the week - further instructions/dosing per them. Elevate R leg when able to help relieve swelling    DIET: Cardiac Diet    ACTIVITY: Activity as tolerated    DISCHARGE MEDICATIONS:  Current Discharge Medication List      START taking these medications    Details   oxyCODONE-acetaminophen (PERCOCET) 5-325 mg per tablet Take 1 Tab by mouth every four (4) hours as needed for Pain for up to 3 days. Max Daily Amount: 6 Tabs. Qty: 5 Tab, Refills: 0    Associated Diagnoses: Cellulitis of leg, right      L. acidoph & paracasei- S therm- Bifido (DIANA-Q/RISAQUAD) 8 billion cell cap cap Take 1 Cap by mouth daily. Qty: 7 Cap, Refills: 0      cephALEXin (KEFLEX) 500 mg capsule Take 1 Cap by mouth four (4) times daily for 4 days. Qty: 16 Cap, Refills: 0         CONTINUE these medications which have CHANGED    Details   warfarin (COUMADIN) 2.5 mg tablet Take 2 Tabs by mouth daily. Qty: 20 Tab, Refills: 0         CONTINUE these medications which have NOT CHANGED    Details   aspirin delayed-release 81 mg tablet Take  by mouth daily. NOTIFY YOUR PHYSICIAN FOR ANY OF THE FOLLOWING:   Fever over 101 degrees for 24 hours. Chest pain, shortness of breath, fever, chills, nausea, vomiting, diarrhea, change in mentation, falling, weakness, bleeding. Severe pain or pain not relieved by medications. Or, any other signs or symptoms that you may have questions about.     DISPOSITION:    Home With:   OT  PT  HH  RN       Long term SNF/Inpatient Rehab    Independent/assisted living    Hospice    Other:     PATIENT CONDITION AT DISCHARGE:   Functional status    Poor     Deconditioned    xx Independent      Cognition   xx  Lucid     Forgetful     Dementia      Catheters/lines (plus indication)    Ritchie     PICC     PEG    xx None      Code status   xx  Full code     DNR      PHYSICAL EXAMINATION AT DISCHARGE:  /77 (BP 1 Location: Right arm, BP Patient Position: At rest)   Pulse 64   Temp 98.1 °F (36.7 °C)   Resp 18   Ht 5' 9\" (1.753 m)   Wt 92.3 kg (203 lb 7.8 oz)   SpO2 98%   BMI 30.05 kg/m²     Pt in bed, seen early this am ~0830 to address possible d/c today. Pt concerned about mobility and reported he could not walk on his foot. PT was consulted and evaluated and has no suggestions for HH/outpatient therapy. They offered crutches but pt reported he did not feel as though they helped much so declined. 1145: Discussed discharge instructions for today including taking course of remaining antibiotics, having PCP check INR by end of week and have them adjust dose as indicated. Constitutional:  No acute distress, cooperative, pleasant    ENT:  Oral MM moist.    Resp:  CTA bilaterally. No accessory muscle use and on RA   CV:  Regular rhythm, normal rate, no murmurs    GI:  Soft, non distended, non tender. Normoactive bowel sounds. Musculoskeletal:  Warm, 2+ pulses throughout. R ankle redness and mild edema. + tender to palpation to out ankle. Neurologic:  Moves all extremities. AAOx3, follows commands                           Psych:  Good insight, Not anxious nor agitated.      CHRONIC MEDICAL DIAGNOSES:  Problem List as of 3/18/2019 Date Reviewed: 3/18/2019          Codes Class Noted - Resolved    * (Principal) Cellulitis of right lower extremity ICD-10-CM: L03.115  ICD-9-CM: 682.6  3/13/2019 - Present        Edema of right lower extremity ICD-10-CM: R60.0  ICD-9-CM: 782.3  3/13/2019 - Present        Elevated liver enzymes ICD-10-CM: R74.8  ICD-9-CM: 790.5  3/16/2016 - Present        RESOLVED: Leukocytosis ICD-10-CM: T90.586  ICD-9-CM: 288.60  3/13/2019 - 3/18/2019            Greater than 30 minutes were spent with the patient on counseling and coordination of care    Signed:   Elmer Mo NP  3/18/2019  11:47 AM

## 2019-03-18 NOTE — DISCHARGE INSTRUCTIONS
Discharge Instructions     PATIENT ID: Tashia Allison  MRN: [id number]   YOB: 1965    DATE OF ADMISSION: 3/13/2019  7:11 PM    DATE OF DISCHARGE: 3/18/2019  PRIMARY CARE PROVIDER: Penelope Ferrell MD   ATTENDING PHYSICIAN: Deangelo Mo MD  DISCHARGING PROVIDER: Daisy Briggs NP. To contact this individual call [phone] and ask the  to page. If unavailable ask to be transferred the Adult Hospitalist Department. DISCHARGE DIAGNOSES  Cellulitis of R Leg  Hx Stroke    FOLLOW UP APPOINTMENTS:   Follow-up Information     Follow up With Specialties Details Why Contact Info    Penelope Ferrell MD [facility] In 5 days  [facility] [address] [phone]         Need to have INR checked/coumadin adjusted as needed. Skin/Cellulitis check up for R lower extremity. ADDITIONAL CARE RECOMMENDATIONS:   - Take full course of antibiotics  - Take probiotics until complete    Your Coumadin has been adjusted to 5 mg daily, please take this dose until you have your INR checked by your PCP by the end of the week - further instructions/dosing per them. Elevate R leg when able to help relieve swelling    DIET: Cardiac Diet    ACTIVITY: Activity as tolerated    · It is important that you take the medication exactly as they are prescribed. · Keep your medication in the bottles provided by the pharmacist and keep a list of the medication names, dosages, and times to be taken in your wallet. · Do not take other medications without consulting your doctor. NOTIFY YOUR PHYSICIAN FOR ANY OF THE FOLLOWING:   Fever over 101 degrees for 24 hours. Chest pain, shortness of breath, fever, chills, nausea, vomiting, diarrhea, change in mentation, falling, weakness, bleeding. Severe pain or pain not relieved by medications. Or, any other signs or symptoms that you may have questions about.     DISPOSITION:    Home With:   OT  PT [place]  RN       SNF/Inpatient Rehab/LTAC    Independent/assisted living    Hospice   xx Other: Home     CDMP Checked:   Yes *x*     PROBLEM LIST Updated:  Yes *x*     Signed:   Federica Jones NP  3/18/2019  11:27 AM

## 2019-03-18 NOTE — PROGRESS NOTES
physical Therapy EVALUATION  Patient: Cecy Omalley (43 y.o. male)  Date: 3/18/2019  Primary Diagnosis: Cellulitis of right lower extremity [L03.115]  Edema of right lower extremity [R60.0]  Leukocytosis [D72.829]       Precautions:        ASSESSMENT :   Based on the objective data described below, the patient presented with Modified independent level overall for transfers. Gait training completed 80 feet using crutches and without crutches and at the Modified independent level of assistance. The following are barriers to independence while in acute care:   -Cognitive and/or behavioral: none  -Medical condition: pain tolerance    -Other:       Discharge recommendations: None     Equipment recommendations for successful discharge (if) home: none; offered crutches but patient felt that did not need and gait was not that much significantly changed with crutches. PLAN :  Discharging further skilled physical therapy at this time. SUBJECTIVE:   Patient stated I'm okay.     OBJECTIVE DATA SUMMARY:   HISTORY:    Past Medical History:   Diagnosis Date    Hypertension     Stroke [facility])    History reviewed. No pertinent surgical history. Prior Level of Function/Home Situation: independent  Personal factors and/or comorbidities impacting plan of care:     Home Situation  Home Environment: Private residence  # Steps to Enter: 1  One/Two Story Residence: One story  Patient Expects to be Discharged to[de-identified] Private residence  Current DME Used/Available at Home: None    EXAMINATION/PRESENTATION/DECISION MAKING:   Critical Behavior:   alert; oriented           Hearing: Auditory  Auditory Impairment: None  Skin:  See nursing notes  Edema: mild R LE foot/ankle  Range Of Motion:         WNL except R ankle limited by pain                 Strength:      WNL -RLE limited by pain                  Coordination:   within normal limits  Functional Mobility:  Bed Mobility:  Rolling: Independent  Supine to Sit: Independent  Sit to Supine: Independent     Transfers:  Sit to Stand: Independent  Stand to Sit: Independent                       Balance:   Sitting: Intact  Standing: Intact  Ambulation/Gait Training:  Distance (ft): 80 Feet (ft)  Assistive Device: Crutches(and without)  Ambulation - Level of Assistance: Modified independent        Gait Abnormalities: Antalgic           Stance: Right decreased  Speed/Verna: Slow  Step Length: Left shortened           After treatment patient left:   Supine in bed  Call light within reach  RN notified     COMMUNICATION/EDUCATION:   The patients plan of care was discussed with: Registered Nurse.       Thank you for this referral.  Vince Romero, PT   Time Calculation: 8 mins

## 2019-03-19 LAB
BACTERIA SPEC CULT: NORMAL
BACTERIA SPEC CULT: NORMAL
SERVICE CMNT-IMP: NORMAL
SERVICE CMNT-IMP: NORMAL

## 2019-03-20 ENCOUNTER — HOSPITAL ENCOUNTER (EMERGENCY)
Age: 54
Discharge: HOME | End: 2019-03-20
Attending: EMERGENCY MEDICINE
Payer: SELF-PAY

## 2019-03-20 VITALS — WEIGHT: 200 LBS | HEIGHT: 70 IN | BODY MASS INDEX: 28.63 KG/M2

## 2019-03-20 LAB
COMMENT, HOLDF: NORMAL
SAMPLES BEING HELD,HOLD: NORMAL

## 2019-03-20 PROCEDURE — 74011250637 HC RX REV CODE- 250/637: Performed by: EMERGENCY MEDICINE

## 2019-03-20 PROCEDURE — 99283 EMERGENCY DEPT VISIT LOW MDM: CPT

## 2019-03-20 PROCEDURE — 36415 COLL VENOUS BLD VENIPUNCTURE: CPT

## 2019-03-20 RX ORDER — OXYCODONE HYDROCHLORIDE 5 MG/1
TABLET ORAL
Status: DISCONTINUED
Start: 2019-03-20 | End: 2019-03-20 | Stop reason: HOSPADM

## 2019-03-20 RX ORDER — ACETAMINOPHEN 500 MG
TABLET ORAL
Status: DISCONTINUED
Start: 2019-03-20 | End: 2019-03-20 | Stop reason: HOSPADM

## 2019-03-20 RX ORDER — OXYCODONE HYDROCHLORIDE 5 MG/1
5 TABLET ORAL
Status: COMPLETED | OUTPATIENT
Start: 2019-03-20 | End: 2019-03-20

## 2019-03-20 RX ORDER — NAPROXEN 250 MG/1
500 TABLET ORAL ONCE
Status: COMPLETED | OUTPATIENT
Start: 2019-03-20 | End: 2019-03-20

## 2019-03-20 RX ORDER — NAPROXEN 250 MG/1
TABLET ORAL
Status: DISCONTINUED
Start: 2019-03-20 | End: 2019-03-20 | Stop reason: HOSPADM

## 2019-03-20 RX ORDER — ACETAMINOPHEN 500 MG
1000 TABLET ORAL
Status: COMPLETED | OUTPATIENT
Start: 2019-03-20 | End: 2019-03-20

## 2019-03-20 RX ADMIN — ACETAMINOPHEN 1000 MG: 500 TABLET ORAL at 04:23

## 2019-03-20 RX ADMIN — OXYCODONE HYDROCHLORIDE 5 MG: 5 TABLET ORAL at 04:23

## 2019-03-20 RX ADMIN — NAPROXEN 500 MG: 250 TABLET ORAL at 04:23
